# Patient Record
Sex: FEMALE | Race: WHITE | NOT HISPANIC OR LATINO | URBAN - METROPOLITAN AREA
[De-identification: names, ages, dates, MRNs, and addresses within clinical notes are randomized per-mention and may not be internally consistent; named-entity substitution may affect disease eponyms.]

---

## 2017-03-30 ENCOUNTER — OFFICE VISIT (OUTPATIENT)
Dept: PODIATRY | Facility: CLINIC | Age: 22
End: 2017-03-30
Payer: COMMERCIAL

## 2017-03-30 VITALS — HEIGHT: 66 IN | WEIGHT: 159.19 LBS | BODY MASS INDEX: 25.58 KG/M2

## 2017-03-30 DIAGNOSIS — M79.671 FOOT PAIN, RIGHT: Primary | ICD-10-CM

## 2017-03-30 DIAGNOSIS — Q66.70 PES CAVUS, CONGENITAL: ICD-10-CM

## 2017-03-30 DIAGNOSIS — M21.6X9 EQUINUS DEFORMITY OF FOOT: ICD-10-CM

## 2017-03-30 DIAGNOSIS — M77.41 METATARSALGIA, RIGHT FOOT: ICD-10-CM

## 2017-03-30 DIAGNOSIS — M20.42 HAMMER TOES OF BOTH FEET: ICD-10-CM

## 2017-03-30 DIAGNOSIS — M20.41 HAMMER TOES OF BOTH FEET: ICD-10-CM

## 2017-03-30 PROCEDURE — 99204 OFFICE O/P NEW MOD 45 MIN: CPT | Mod: S$GLB,,, | Performed by: PODIATRIST

## 2017-03-30 PROCEDURE — 1160F RVW MEDS BY RX/DR IN RCRD: CPT | Mod: S$GLB,,, | Performed by: PODIATRIST

## 2017-03-30 PROCEDURE — 99999 PR PBB SHADOW E&M-EST. PATIENT-LVL III: CPT | Mod: PBBFAC,,, | Performed by: PODIATRIST

## 2017-03-30 NOTE — LETTER
March 30, 2017      [facility]  [address]  [place] [address]  Phone: [phone]       Patient: Nelly Esquivel   YOB: 1995  Date of Visit: 03/30/2017    To Whom It May Concern:    Nelly Kasper was at [facility] on 03/30/2017. She may return to work/school on 3/30/17 with restrictions.  She may require use of casual or athletic shoe gear while at work due to an issue of the lower extremity.  If you have any questions or concerns, or if I can be of further assistance, please do not hesitate to contact me.    Sincerely,    Gerardo Lopez DPM

## 2017-03-30 NOTE — PROGRESS NOTES
"Subjective:      Patient ID: Nelly Esquivel is a 21 y.o. female.    Chief Complaint: Foot Pain (rt foot) and Hammer Toe    Patient presents to clinic with the complaint of Rt. foot pain > 1 year.  States symptoms are isolated to the Rt. Forefoot, specifically near the 2nd-4th toes.  Describes pain as sharp and rates as a 6/10.  States symptoms are exacerbated with prolonged weight bearing and alleviated with rest.  Has not attempted to self treat.  Relates working on her feet daily in nonskid shoes and usually walks barefoot or in flip flops while at home.  Denies definitive injury to the affected foot.  Also, relates noticing "drawing up" of the toes of the Rt. Foot.  Denies this being associated with pain, however, has begun to develop a callus to the Rt. 4th toe due to inward rotation of the Rt. 5th toe.  Inquires as to whether or not this needs to be treated.  Denies any additional pedal complaints.      No past medical history on file.    Past Surgical History:   Procedure Laterality Date    ESOPHAGUS SURGERY         Family History   Problem Relation Age of Onset    Breast cancer Neg Hx        Social History     Social History    Marital status: Single     Spouse name: N/A    Number of children: N/A    Years of education: N/A     Social History Main Topics    Smoking status: Never Smoker    Smokeless tobacco: None    Alcohol use Yes      Comment: occassionally    Drug use: No    Sexual activity: Yes     Birth control/ protection: None     Other Topics Concern    None     Social History Narrative       Current Outpatient Prescriptions   Medication Sig Dispense Refill    fluoxetine (PROZAC) 20 MG capsule TAKE 3 CAPSULES(60 MG) BY MOUTH EVERY DAY 90 capsule 5    medroxyPROGESTERone (DEPO-PROVERA) 150 mg/mL injection Inject 150 mg into the muscle every 3 (three) months.      trazodone (DESYREL) 100 MG tablet Take 1 tablet (100 mg total) by mouth every evening. 30 tablet 0     Current " Facility-Administered Medications   Medication Dose Route Frequency Provider Last Rate Last Dose    medroxyPROGESTERone (DEPO-PROVERA) injection 150 mg  150 mg Intramuscular Q90 Days Luisito Purcell MD   150 mg at 05/23/16 1441       Review of patient's allergies indicates:  No Known Allergies  Review of Systems   Constitution: Negative for chills and fever.   Cardiovascular: Negative for claudication and leg swelling.   Skin: Negative for color change and nail changes.   Musculoskeletal: Positive for joint pain. Negative for arthritis and joint swelling.   Neurological: Negative for numbness and paresthesias.   Psychiatric/Behavioral: Negative for altered mental status.           Objective:      Physical Exam   Constitutional: She is oriented to person, place, and time. She appears well-developed and well-nourished. No distress.   Cardiovascular:   Pulses:       Dorsalis pedis pulses are 2+ on the right side, and 2+ on the left side.        Posterior tibial pulses are 2+ on the right side, and 2+ on the left side.   CFT <3 seconds bilateral.  Pedal hair growth present bilateral.   No varicosities noted bilateral.  No bilateral lower extremity edema.   Musculoskeletal: She exhibits tenderness. She exhibits no edema.   Muscle strength 5/5 in all muscle groups bilateral.  No tenderness nor crepitation with ROM of foot/ankle joints bilateral.  Pain with palpation of the Rt. Sub 2nd - 4th metatarsal heads.  (-) Lachman sign on the Rt.  Bilateral semi-reducible contracture of toes 2-5.  Bilateral pes cavus foot type.  Bilateral gastrocnemius equinus.       Neurological: She is alert and oriented to person, place, and time. She has normal strength. No sensory deficit.   Light touch intact bilateral.     Skin: Skin is warm, dry and intact. Lesion noted. No abrasion, no bruising, no burn, no ecchymosis, no laceration, no petechiae and no rash noted. She is not diaphoretic. No cyanosis or erythema. No pallor. Nails show  no clubbing.   Pedal skin has normal turgor, temperature, and texture bilateral.  Toenails x 10 appear normotrophic.  Hyperkeratotic lesion noted to the lateral aspect of the Rt. 4th toe. Examination of the skin reveals no evidence of significant maceration, rashes, open lesions, suspicious appearing nevi or other concerning lesions.              Assessment:       Encounter Diagnoses   Name Primary?    Foot pain, right Yes    Metatarsalgia, right foot     Equinus deformity of foot     Pes cavus, congenital     Hammer toes of both feet          Plan:       Nelly was seen today for foot pain and hammer toe.    Diagnoses and all orders for this visit:    Foot pain, right    Metatarsalgia, right foot    Equinus deformity of foot    Pes cavus, congenital    Hammer toes of both feet      I counseled the patient on her conditions, their implications and medical management.    Fitted and dispensed a toe sleeve to be worn on the Rt. 4th toe to reduce rate of callus build up.    Fitted and dispensed a metatarsal pad to offload the Rt. Forefoot.      Advised on certain shoes and insoles that accommodate for both foot type and digital deformities.      Instructed to begin performing stretching exercises twice daily to offset equinus.    Discussed avoidance of barefoot walking and wearing flats/flip flops as this will exacerbate symptoms.    Discussed application of ice to the forefoot daily up to 20 minutes.    Advised to take an oral nsaid and apply a topical nsaid.    Patient to follow up in 3 weeks.  If symptoms unimproved will refer to Physical Therapy.    Gerardo Lopze DPM

## 2017-03-30 NOTE — MR AVS SNAPSHOT
[facility]  [address]  [facility] [address]  Phone: [phone]                  Nelly Esquivel   3/30/2017 1:20 PM   Office Visit    Description:  Female : 1995   Provider:  Gerardo Lopez DPM   Department:  [facility]           Reason for Visit     Foot Pain     Hammer Toe           Diagnoses this Visit        Comments    Foot pain, right    -  Primary     Metatarsalgia, right foot         Equinus deformity of foot         Pes cavus, congenital         Hammer toes of both feet                To Do List           Goals (5 Years of Data)     None      [facility]     [facility] Care Line -  Assistance  Unless otherwise directed by your provider, please contact [facility], our nurse care line that is available for  assistance.     Registered nurses in the [facility] provide: appointment scheduling, clinical advisement, health education, and other advisory services.  Call: [phone] (toll free)               Medications           Message regarding Medications     Verify the changes and/or additions to your medication regime listed below are the same as discussed with your clinician today.  If any of these changes or additions are incorrect, please notify your healthcare provider.             Verify that the below list of medications is an accurate representation of the medications you are currently taking.  If none reported, the list may be blank. If incorrect, please contact your healthcare provider. Carry this list with you in case of emergency.           Current Medications     fluoxetine (PROZAC) 20 MG capsule TAKE 3 CAPSULES(60 MG) BY MOUTH EVERY DAY    medroxyPROGESTERone (DEPO-PROVERA) 150 mg/mL injection Inject 150 mg into the muscle every 3 (three) months.    trazodone (DESYREL) 100 MG tablet Take 1 tablet (100 mg total) by mouth every evening.           Clinical Reference Information           Your Vitals Were   "   Height Weight BMI          5' 6" (1.676 m) 72.2 kg (159 lb 2.8 oz) 25.69 kg/m2        Allergies as of 3/30/2017     No Known Allergies      Immunizations Administered on Date of Encounter - 3/30/2017     None      Instructions    - Given verbal and written instructions regarding stretching exercises to help reduce equinus deformity.    - Recommended wearing supportive shoes and OTC insoles to accommodate for foot type.    - Rest and ice the affected forefoot up to 20 minutes daily.    - Instructed to take ibuprofen prn for pain.  May also apply a topical nsaid to help with pain.    - Avoid barefoot walking, flats, and slippers as this will exacerbate symptoms.    - Avoid squatting, stooping, and running as these activities will exacerbate symptoms.           Language Assistance Services     ATTENTION: Language assistance services are available, free of charge. Please call [phone].      ATENCIÓN: Si francheska cullen, tiene a maguire disposición servicios gratuitos de asistencia lingüística. Llame al [phone].     LUCERO Ý: N?u b?n nói Ti?ng Vi?t, có các d?ch v? h? tr? ngôn ng? mi?n phí dành cho b?n. G?i s? [phone].         [facility] complies with applicable Federal civil rights laws and does not discriminate on the basis of race, color, national origin, age, disability, or sex.        "

## 2017-03-30 NOTE — PATIENT INSTRUCTIONS
- Given verbal and written instructions regarding stretching exercises to help reduce equinus deformity.    - Recommended wearing supportive shoes and OTC insoles to accommodate for foot type.    - Rest and ice the affected forefoot up to 20 minutes daily.    - Instructed to take ibuprofen prn for pain.  May also apply a topical nsaid to help with pain.    - Avoid barefoot walking, flats, and slippers as this will exacerbate symptoms.    - Avoid squatting, stooping, and running as these activities will exacerbate symptoms.

## 2017-10-26 ENCOUNTER — OFFICE VISIT (OUTPATIENT)
Dept: OBSTETRICS AND GYNECOLOGY | Facility: CLINIC | Age: 22
End: 2017-10-26
Payer: COMMERCIAL

## 2017-10-26 VITALS
WEIGHT: 138.88 LBS | HEIGHT: 66 IN | SYSTOLIC BLOOD PRESSURE: 118 MMHG | BODY MASS INDEX: 22.32 KG/M2 | DIASTOLIC BLOOD PRESSURE: 62 MMHG

## 2017-10-26 DIAGNOSIS — Z12.4 ENCOUNTER FOR PAP SMEAR OF CERVIX WITH HPV DNA COTESTING: Primary | ICD-10-CM

## 2017-10-26 DIAGNOSIS — Z11.3 SCREENING EXAMINATION FOR STD (SEXUALLY TRANSMITTED DISEASE): ICD-10-CM

## 2017-10-26 PROCEDURE — 87624 HPV HI-RISK TYP POOLED RSLT: CPT

## 2017-10-26 PROCEDURE — 99213 OFFICE O/P EST LOW 20 MIN: CPT | Mod: S$GLB,,, | Performed by: SPECIALIST

## 2017-10-26 PROCEDURE — 87591 N.GONORRHOEAE DNA AMP PROB: CPT

## 2017-10-26 PROCEDURE — 99999 PR PBB SHADOW E&M-EST. PATIENT-LVL III: CPT | Mod: PBBFAC,,, | Performed by: SPECIALIST

## 2017-10-26 PROCEDURE — 88175 CYTOPATH C/V AUTO FLUID REDO: CPT

## 2017-10-26 NOTE — PROGRESS NOTES
23 yo WF presents for annual gyn evaluation. Desires STD screen but no overt gyn complaints.  History reviewed. No pertinent past medical history.    Past Surgical History:   Procedure Laterality Date    ESOPHAGUS SURGERY         Family History   Problem Relation Age of Onset    Breast cancer Neg Hx        Social History     Social History    Marital status: Single     Spouse name: N/A    Number of children: N/A    Years of education: N/A     Social History Main Topics    Smoking status: Never Smoker    Smokeless tobacco: Never Used    Alcohol use Yes      Comment: occassionally    Drug use: No    Sexual activity: Yes     Partners: Male     Birth control/ protection: None     Other Topics Concern    None     Social History Narrative    None       Current Outpatient Prescriptions   Medication Sig Dispense Refill    fluoxetine (PROZAC) 20 MG capsule TAKE 3 CAPSULES(60 MG) BY MOUTH EVERY DAY 90 capsule 5    medroxyPROGESTERone (DEPO-PROVERA) 150 mg/mL injection Inject 150 mg into the muscle every 3 (three) months.      trazodone (DESYREL) 100 MG tablet Take 1 tablet (100 mg total) by mouth every evening. 30 tablet 0     No current facility-administered medications for this visit.        Review of patient's allergies indicates:  No Known Allergies    Review of System:   General: no chills, fever, night sweats, weight gain or weight loss  Psychological: no depression or suicidal ideation  Breasts: no new or changing breast lumps, nipple discharge or masses.  Respiratory: no cough, shortness of breath, or wheezing  Cardiovascular: no chest pain or dyspnea on exertion  Gastrointestinal: no abdominal pain, change in bowel habits, or black or bloody stools  Genito-Urinary: no incontinence, urinary frequency/urgency or vulvar/vaginal symptoms, pelvic pain or abnormal vaginal bleeding.  Musculoskeletal: no gait disturbance or muscular weakness    General Appearance:  Alert, cooperative, no distress, appears  stated age   Head:  Normocephalic, without obvious abnormality, atraumatic   Eyes:  PERRL, conjunctiva/corneas clear, EOM's intact, fundi benign, both eyes   Ears:  Normal TM's and external ear canals, both ears   Nose: Nares normal, septum midline,mucosa normal, no drainage or sinus tenderness   Throat: Lips, mucosa, and tongue normal; teeth and gums normal   Neck: Supple, symmetrical, trachea midline, no adenopathy;  thyroid: not enlarged, symmetric, no tenderness/mass/nodules; no carotid bruit or JVD   Back:   Symmetric, no curvature, ROM normal, no CVA tenderness   Lungs:   Clear to auscultation bilaterally, respirations unlabored   Breasts:  No masses or tenderness   Heart:  Regular rate and rhythm, S1 and S2 normal, no murmur, rub, or gallop   Abdomen:   Soft, non-tender, bowel sounds active all four quadrants,  no masses, no organomegaly    Genitourinary:   External rectal exam shows no thrombosed external hemorrhoids.   Pelvic exam was performed with patient supine.  No labial fusion.  There is no rash, lesion or injury on the right labia.  There is no rash, lesion or injury on the left labia.  No bleeding and no signs of injury around the vaginal introitus, urethra is without lesions and well supported. The cervix is visualized with no discharge, lesions or friability.  No vaginal discharge found.  No significant Cystocele, Enterocele or rectocele, and uterus well supported.  Bimanual exam:  The urethra is normal to palpation and there are no palpable vaginal wall masses.  Uterus is not deviated, not enlarged, not fixed, normal shape and not tender.  Cervix exhibits no motion tenderness.   Right adnexum displays no mass and no tenderness.  Left adnexum displays no mass and no tenderness.   Extremities: Extremities normal, atraumatic, no cyanosis or edema   Pulses: 2+ and symmetric   Skin: Skin color, texture, turgor normal, no rashes or lesions   Lymph nodes: Cervical, supraclavicular, and axillary nodes  normal   Neurologic: Normal           PAP submitted    Plan Cervical cultures, PAP submitted  RTO 1 year/prn

## 2017-10-27 LAB
C TRACH DNA SPEC QL NAA+PROBE: NOT DETECTED
N GONORRHOEA DNA SPEC QL NAA+PROBE: NOT DETECTED

## 2017-11-01 LAB
HPV16 AG SPEC QL: NEGATIVE
HPV16+18+H RISK 12 DNA CVX-IMP: NEGATIVE
HPV18 DNA SPEC QL NAA+PROBE: NEGATIVE

## 2017-12-24 ENCOUNTER — OFFICE VISIT (OUTPATIENT)
Dept: URGENT CARE | Facility: CLINIC | Age: 22
End: 2017-12-24
Payer: COMMERCIAL

## 2017-12-24 VITALS
TEMPERATURE: 100 F | HEART RATE: 99 BPM | WEIGHT: 138 LBS | SYSTOLIC BLOOD PRESSURE: 126 MMHG | DIASTOLIC BLOOD PRESSURE: 83 MMHG | BODY MASS INDEX: 22.18 KG/M2 | RESPIRATION RATE: 18 BRPM | OXYGEN SATURATION: 97 % | HEIGHT: 66 IN

## 2017-12-24 DIAGNOSIS — J10.1 INFLUENZA A: ICD-10-CM

## 2017-12-24 DIAGNOSIS — R68.89 FLU-LIKE SYMPTOMS: Primary | ICD-10-CM

## 2017-12-24 LAB
CTP QC/QA: YES
FLUAV AG NPH QL: POSITIVE
FLUBV AG NPH QL: NEGATIVE

## 2017-12-24 PROCEDURE — 99214 OFFICE O/P EST MOD 30 MIN: CPT | Mod: 25,S$GLB,, | Performed by: FAMILY MEDICINE

## 2017-12-24 PROCEDURE — 87804 INFLUENZA ASSAY W/OPTIC: CPT | Mod: QW,S$GLB,, | Performed by: FAMILY MEDICINE

## 2017-12-24 RX ORDER — OSELTAMIVIR PHOSPHATE 75 MG/1
75 CAPSULE ORAL 2 TIMES DAILY
Qty: 10 CAPSULE | Refills: 0 | Status: SHIPPED | OUTPATIENT
Start: 2017-12-24 | End: 2018-01-03

## 2017-12-24 NOTE — PATIENT INSTRUCTIONS
Symptomatic treatment:    Tylenol every 4 hours  Ibuprofen ever 6 hours  salt water gargles  Cold-eeze helps to reduce the duration of sore throat symptoms  Cepachol helps to numb the discomfort  Chloroseptic spray  Nasal saline spray reduces inflammation and dryness  Warm face compresses as often as you can  Vicks vapor rub at night  Flonase OTC or Nasacort OTC  Simple foods like chicken noodle soup help  Delsym helps with coughing at night  Zyrtec/Claritin during the day and Benadryl at night may help if allergy component   Zantac will help if there is reflux from the post nasal drip  Rest as much as you can

## 2017-12-24 NOTE — PROGRESS NOTES
"Subjective:       Patient ID: Nelly Esquivel is a 22 y.o. female.    Vitals:  height is 5' 6" (1.676 m) and weight is 62.6 kg (138 lb). Her temperature is 100.1 °F (37.8 °C). Her blood pressure is 126/83 and her pulse is 99. Her respiration is 18 and oxygen saturation is 97%.     Chief Complaint: Sinus Problem    Sinus Problem   This is a new problem. The current episode started yesterday. The problem is unchanged. The maximum temperature recorded prior to her arrival was 100.4 - 100.9 F. The fever has been present for less than 1 day. Associated symptoms include chills, congestion, coughing, sinus pressure and a sore throat. Pertinent negatives include no ear pain, headaches, hoarse voice or shortness of breath. Past treatments include oral decongestants. The treatment provided mild relief.     Review of Systems   Constitution: Positive for chills, fever and malaise/fatigue.   HENT: Positive for congestion, sinus pressure and sore throat. Negative for ear pain and hoarse voice.    Eyes: Negative for discharge and redness.   Cardiovascular: Negative for chest pain, dyspnea on exertion and leg swelling.   Respiratory: Positive for cough and sputum production. Negative for shortness of breath and wheezing.    Musculoskeletal: Negative for myalgias.   Gastrointestinal: Negative for abdominal pain and nausea.   Neurological: Negative for headaches.       Objective:      Physical Exam   Constitutional: She is oriented to person, place, and time. She appears well-developed and well-nourished. She is cooperative.  Non-toxic appearance. She does not appear ill. No distress.   HENT:   Head: Normocephalic and atraumatic.   Right Ear: Hearing, tympanic membrane, external ear and ear canal normal.   Left Ear: Hearing, tympanic membrane, external ear and ear canal normal.   Nose: Mucosal edema and rhinorrhea present. No nasal deformity. No epistaxis. Right sinus exhibits no maxillary sinus tenderness and no frontal sinus " tenderness. Left sinus exhibits no maxillary sinus tenderness and no frontal sinus tenderness.   Mouth/Throat: Uvula is midline, oropharynx is clear and moist and mucous membranes are normal. No trismus in the jaw. Normal dentition. No uvula swelling. No posterior oropharyngeal erythema.   Eyes: Conjunctivae and lids are normal. No scleral icterus.   Sclera clear bilat   Neck: Trachea normal, full passive range of motion without pain and phonation normal. Neck supple.   Cardiovascular: Normal rate, regular rhythm, normal heart sounds, intact distal pulses and normal pulses.    Pulmonary/Chest: Effort normal and breath sounds normal. No respiratory distress.   Abdominal: Soft. Normal appearance and bowel sounds are normal. She exhibits no distension. There is no tenderness.   Musculoskeletal: Normal range of motion. She exhibits no edema or deformity.   Neurological: She is alert and oriented to person, place, and time. She exhibits normal muscle tone. Coordination normal.   Skin: Skin is warm, dry and intact. She is not diaphoretic. No pallor.   Psychiatric: She has a normal mood and affect. Her speech is normal and behavior is normal. Judgment and thought content normal. Cognition and memory are normal.   Nursing note and vitals reviewed.      Assessment:       1. Flu-like symptoms    2. Influenza A        Plan:         Flu-like symptoms  -     POCT Influenza A/B    Influenza A    Other orders  -     oseltamivir (TAMIFLU) 75 MG capsule; Take 1 capsule (75 mg total) by mouth 2 (two) times daily.  Dispense: 10 capsule; Refill: 0

## 2017-12-27 ENCOUNTER — TELEPHONE (OUTPATIENT)
Dept: URGENT CARE | Facility: CLINIC | Age: 22
End: 2017-12-27

## 2018-09-28 ENCOUNTER — TELEPHONE (OUTPATIENT)
Dept: OTOLARYNGOLOGY | Facility: CLINIC | Age: 23
End: 2018-09-28
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
## 2018-11-30 ENCOUNTER — OFFICE VISIT (OUTPATIENT)
Dept: OTOLARYNGOLOGY | Facility: CLINIC | Age: 23
End: 2018-11-30
Payer: COMMERCIAL

## 2018-11-30 VITALS — BODY MASS INDEX: 22.92 KG/M2 | WEIGHT: 142 LBS

## 2018-11-30 DIAGNOSIS — K21.9 GASTROESOPHAGEAL REFLUX DISEASE, ESOPHAGITIS PRESENCE NOT SPECIFIED: ICD-10-CM

## 2018-11-30 DIAGNOSIS — F41.1 GAD (GENERALIZED ANXIETY DISORDER): ICD-10-CM

## 2018-11-30 DIAGNOSIS — J38.6 SUBGLOTTIC STENOSIS: Primary | ICD-10-CM

## 2018-11-30 DIAGNOSIS — R06.00 DYSPNEA, UNSPECIFIED TYPE: ICD-10-CM

## 2018-11-30 PROCEDURE — 99204 OFFICE O/P NEW MOD 45 MIN: CPT | Mod: 25,S$GLB,, | Performed by: OTOLARYNGOLOGY

## 2018-11-30 PROCEDURE — 99999 PR PBB SHADOW E&M-EST. PATIENT-LVL III: CPT | Mod: PBBFAC,,, | Performed by: OTOLARYNGOLOGY

## 2018-11-30 PROCEDURE — 31575 DIAGNOSTIC LARYNGOSCOPY: CPT | Mod: S$GLB,,, | Performed by: OTOLARYNGOLOGY

## 2018-11-30 PROCEDURE — 3008F BODY MASS INDEX DOCD: CPT | Mod: CPTII,S$GLB,, | Performed by: OTOLARYNGOLOGY

## 2018-11-30 RX ORDER — TRETINOIN 0.25 MG/G
CREAM TOPICAL
COMMUNITY
Start: 2018-09-26
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
## 2018-12-17 ENCOUNTER — OFFICE VISIT (OUTPATIENT)
Dept: FAMILY MEDICINE | Facility: CLINIC | Age: 23
End: 2018-12-17
Payer: COMMERCIAL

## 2018-12-17 VITALS
DIASTOLIC BLOOD PRESSURE: 66 MMHG | HEART RATE: 54 BPM | BODY MASS INDEX: 23.17 KG/M2 | WEIGHT: 144.19 LBS | SYSTOLIC BLOOD PRESSURE: 114 MMHG | HEIGHT: 66 IN

## 2018-12-17 DIAGNOSIS — Z30.013 ENCOUNTER FOR INITIAL PRESCRIPTION OF INJECTABLE CONTRACEPTIVE: ICD-10-CM

## 2018-12-17 DIAGNOSIS — Z12.4 ENCOUNTER FOR PAPANICOLAOU SMEAR OF CERVIX: Primary | ICD-10-CM

## 2018-12-17 PROCEDURE — 3008F BODY MASS INDEX DOCD: CPT | Mod: CPTII,S$GLB,, | Performed by: NURSE PRACTITIONER

## 2018-12-17 PROCEDURE — 99999 PR PBB SHADOW E&M-EST. PATIENT-LVL III: CPT | Mod: PBBFAC,,, | Performed by: NURSE PRACTITIONER

## 2018-12-17 PROCEDURE — 99213 OFFICE O/P EST LOW 20 MIN: CPT | Mod: 25,S$GLB,, | Performed by: NURSE PRACTITIONER

## 2018-12-17 PROCEDURE — 88175 CYTOPATH C/V AUTO FLUID REDO: CPT

## 2018-12-17 PROCEDURE — 96372 THER/PROPH/DIAG INJ SC/IM: CPT | Mod: S$GLB,,, | Performed by: NURSE PRACTITIONER

## 2018-12-17 RX ORDER — MEDROXYPROGESTERONE ACETATE 150 MG/ML
150 INJECTION, SUSPENSION INTRAMUSCULAR
Status: SHIPPED | OUTPATIENT
Start: 2018-12-17 | End: 2019-12-12

## 2018-12-17 RX ADMIN — MEDROXYPROGESTERONE ACETATE 150 MG: 150 INJECTION, SUSPENSION INTRAMUSCULAR at 02:12

## 2018-12-17 NOTE — NURSING
2 Patient identifiers used (name & ). Administered 150 mgs of Depo Provera IM to R Glt. Patient tolerated well. No bleeding at insertion site noted. Pain scale 0/10. Regular interval cycle maintained. Allergies reviewed. Aseptic technique maintained.     Next Injection due: 3/4-3/18/19  Appointment- 3/8/19 at 11am    UPT negative- patient is transferring from a doctor in [place]. We did not have documentation of her injection history so UPT was done. She stated that she was about a week late due to the switch.

## 2018-12-17 NOTE — PROGRESS NOTES
"Chief Complaint   Patient presents with    Well Woman       History of Present Illness: Nelly Esquivel is a 23 y.o. female that presents today 2018 for   Pt presents today for follow-up pap smear. She reports that she was seeing someone in [place] and had a "slightly abnormal" pap smear 6 months ago. She cannot recall the facility or doctor, but was told to have a repeat in 6 months. She is also due for her depo injection. No other complaints or concerns noted.         Chief Complaint   Patient presents with    Well Woman         History reviewed. No pertinent past medical history.    Past Surgical History:   Procedure Laterality Date    dilation of subglottic stenosis  2009    x3    ESOPHAGUS SURGERY      ORIF FACIAL FRACTURE         Current Outpatient Medications   Medication Sig Dispense Refill    tretinoin (RETIN-A) 0.025 % cream        Current Facility-Administered Medications   Medication Dose Route Frequency Provider Last Rate Last Dose    medroxyPROGESTERone (DEPO-PROVERA) injection 150 mg  150 mg Intramuscular Q90 Days Peggy Salazar, NP   150 mg at 18 1443       Review of patient's allergies indicates:  No Known Allergies    Family History   Problem Relation Age of Onset    Breast cancer Neg Hx        Social History     Tobacco Use    Smoking status: Current Every Day Smoker    Smokeless tobacco: Current User   Substance Use Topics    Alcohol use: Yes     Comment: occassionally    Drug use: No       OB History    Para Term  AB Living   0 0 0 0 0 0   SAB TAB Ectopic Multiple Live Births   0 0 0 0               Review of Symptoms:  GENERAL: Denies weight gain or weight loss. Feeling well overall.   SKIN: Denies rash or lesions.   HEAD: Denies head injury or headache.   ABDOMEN: No abdominal pain, constipation, diarrhea, nausea, vomiting or rectal bleeding.   URINARY: No frequency, dysuria, hematuria, or burning on urination.    /66   Pulse (!) 54   " "Ht 5' 6" (1.676 m)   Wt 65.4 kg (144 lb 2.9 oz)   LMP 11/18/2018   Physical Exam:  APPEARANCE: Well nourished, well developed, in no acute distress.  SKIN: Normal skin turgor, no lesions.  RESPIRATORY: Normal respiratory effort with no retractions or use of accessory muscles  PELVIC: Normal external female genitalia without lesions. Normal hair distribution. Vagina moist and well rugated without lesions or discharge. Cervix pink and without lesions. No significant cystocele or rectocele. PAP DONE.    ASSESSMENT/PLAN:  Encounter for Papanicolaou smear of cervix  -     Liquid-based pap smear, screening    Encounter for initial prescription of injectable contraceptive  -     POCT Urine Pregnancy  -     medroxyPROGESTERone (DEPO-PROVERA) injection 150 mg      UPT (-)    The use of hormonal contraception has been fully discussed with the patient. We discussed all options including OCPs, transdermal patches, vaginal ring, Depo Provera injections, Nexplanon, and IUDs. Warnings about anticipated minor side effects such as breakthrough spotting, nausea, breast tenderness, weight changes, acne, headaches, etc were given.  She has been told of the more serious potential side effects such as MI, stroke, and deep vein thrombosis, all of which are very unlikely.  She has been asked to report any signs of such serious problems immediately. The need for additional protection, such as a condom, to prevent exposure to sexually transmitted diseases has also been discussed- the patient has been clearly reminded that no hormonal contraceptive method can protect her against diseases such as HIV and others. She understands and wishes to take the medication as prescribed. She wishes to continue Depo-Provera.    Follow-up:  RTC in 1 year for well woman exam or as needed          "

## 2019-03-08 ENCOUNTER — CLINICAL SUPPORT (OUTPATIENT)
Dept: FAMILY MEDICINE | Facility: CLINIC | Age: 24
End: 2019-03-08
Payer: COMMERCIAL

## 2019-03-08 DIAGNOSIS — Z30.42 ENCOUNTER FOR MANAGEMENT AND INJECTION OF DEPO-PROVERA: Primary | ICD-10-CM

## 2019-03-08 PROCEDURE — 99999 PR PBB SHADOW E&M-EST. PATIENT-LVL II: CPT | Mod: PBBFAC,,,

## 2019-03-08 PROCEDURE — 99999 PR PBB SHADOW E&M-EST. PATIENT-LVL II: ICD-10-PCS | Mod: PBBFAC,,,

## 2019-03-08 NOTE — PROGRESS NOTES
2 Patient identifiers used (name & ). Administered 150 mgs of Depo Provera IM to R Glt. Patient tolerated well. No bleeding at insertion site noted. Pain scale 0/10. Regular interval cycle maintained. Allergies reviewed. Aseptic technique maintained.     Next Injection due: -    Appt scheduled on 19 at 1pm

## 2019-05-28 ENCOUNTER — CLINICAL SUPPORT (OUTPATIENT)
Dept: FAMILY MEDICINE | Facility: CLINIC | Age: 24
End: 2019-05-28
Payer: COMMERCIAL

## 2019-05-28 NOTE — PROGRESS NOTES
Patient's window: -  Next Depo due: -  Last OBGYN visit: 18  OBGYN visit due before next injection: No    Patient verified by name and . Patient received 150mg depo-provera via injection into right ventrogluteal. Patient tolerated injection well. Next appointment scheduled for 19.

## 2019-08-13 ENCOUNTER — CLINICAL SUPPORT (OUTPATIENT)
Dept: INTERNAL MEDICINE | Facility: CLINIC | Age: 24
End: 2019-08-13
Payer: COMMERCIAL

## 2019-08-13 PROCEDURE — 96372 THER/PROPH/DIAG INJ SC/IM: CPT | Mod: S$GLB,,, | Performed by: NURSE PRACTITIONER

## 2019-08-13 PROCEDURE — 96372 PR INJECTION,THERAP/PROPH/DIAG2ST, IM OR SUBCUT: ICD-10-PCS | Mod: S$GLB,,, | Performed by: NURSE PRACTITIONER

## 2019-08-13 RX ADMIN — MEDROXYPROGESTERONE ACETATE 150 MG: 150 INJECTION, SUSPENSION INTRAMUSCULAR at 01:08

## 2019-08-13 NOTE — PROGRESS NOTES
Patient verified by name and . Patient received 150mg depo-provera via injection into right ventrogluteal. Patient tolerated injection well.   Advised after next injection, will need make appointment with OB/GYN for annual visit.   Verbalizes understanding.      Last OBGYN visit: 18  Patient window 10/29--  Next appointment scheduled for 19

## 2019-11-08 ENCOUNTER — CLINICAL SUPPORT (OUTPATIENT)
Dept: INTERNAL MEDICINE | Facility: CLINIC | Age: 24
End: 2019-11-08
Payer: COMMERCIAL

## 2019-11-08 PROCEDURE — 96372 THER/PROPH/DIAG INJ SC/IM: CPT | Mod: S$GLB,,, | Performed by: NURSE PRACTITIONER

## 2019-11-08 PROCEDURE — 96372 PR INJECTION,THERAP/PROPH/DIAG2ST, IM OR SUBCUT: ICD-10-PCS | Mod: S$GLB,,, | Performed by: NURSE PRACTITIONER

## 2019-11-08 RX ADMIN — MEDROXYPROGESTERONE ACETATE 150 MG: 150 INJECTION, SUSPENSION INTRAMUSCULAR at 01:11

## 2019-11-08 NOTE — PROGRESS NOTES
Patient identified by name and  with verbal feedback. Depo-Provera 150 mg  administered IM to left ventrogluteal using aseptic technique. Patient tolerated well, no adverse reactions noted/reported.  Patient needs annual f/u with OB/GYN, and new orders prior to injection    Last ob/gyn  18    Window 1-24- 2-7    Next injection due and scheduled for 20 @ 2:30 p.m./mp

## 2020-01-30 ENCOUNTER — TELEPHONE (OUTPATIENT)
Dept: FAMILY MEDICINE | Facility: CLINIC | Age: 25
End: 2020-01-30

## 2020-01-30 NOTE — TELEPHONE ENCOUNTER
Left voice mail for pt to return call.  Patient needs annual visit with OB/GYN prior to getting Depo-provera injection.  Last ob/gyn appointment was on 12/17/18 .  My [facility] message also sent./mp